# Patient Record
Sex: FEMALE | Race: BLACK OR AFRICAN AMERICAN | NOT HISPANIC OR LATINO | URBAN - METROPOLITAN AREA
[De-identification: names, ages, dates, MRNs, and addresses within clinical notes are randomized per-mention and may not be internally consistent; named-entity substitution may affect disease eponyms.]

---

## 2019-02-20 PROBLEM — Z00.00 ENCOUNTER FOR PREVENTIVE HEALTH EXAMINATION: Status: ACTIVE | Noted: 2019-02-20

## 2019-06-18 ENCOUNTER — INPATIENT (INPATIENT)
Facility: HOSPITAL | Age: 34
LOS: 2 days | Discharge: HOME | End: 2019-06-21
Attending: OBSTETRICS & GYNECOLOGY | Admitting: OBSTETRICS & GYNECOLOGY
Payer: COMMERCIAL

## 2019-06-18 ENCOUNTER — RESULT REVIEW (OUTPATIENT)
Age: 34
End: 2019-06-18

## 2019-06-18 VITALS — WEIGHT: 180.78 LBS | HEIGHT: 67 IN

## 2019-06-18 LAB
BLD GP AB SCN SERPL QL: NEGATIVE — SIGNIFICANT CHANGE UP
HCT VFR BLD CALC: 39.6 % — SIGNIFICANT CHANGE UP (ref 34.5–45)
HGB BLD-MCNC: 12.2 G/DL — SIGNIFICANT CHANGE UP (ref 11.5–15.5)
MCHC RBC-ENTMCNC: 23.9 PG — LOW (ref 27–34)
MCHC RBC-ENTMCNC: 30.8 GM/DL — LOW (ref 32–36)
MCV RBC AUTO: 77.6 FL — LOW (ref 80–100)
NRBC # BLD: 0 /100 WBCS — SIGNIFICANT CHANGE UP (ref 0–0)
PLATELET # BLD AUTO: 221 K/UL — SIGNIFICANT CHANGE UP (ref 150–400)
RBC # BLD: 5.1 M/UL — SIGNIFICANT CHANGE UP (ref 3.8–5.2)
RBC # FLD: 15.9 % — HIGH (ref 10.3–14.5)
RH IG SCN BLD-IMP: POSITIVE — SIGNIFICANT CHANGE UP
RH IG SCN BLD-IMP: POSITIVE — SIGNIFICANT CHANGE UP
WBC # BLD: 7.51 K/UL — SIGNIFICANT CHANGE UP (ref 3.8–10.5)
WBC # FLD AUTO: 7.51 K/UL — SIGNIFICANT CHANGE UP (ref 3.8–10.5)

## 2019-06-18 RX ORDER — OXYTOCIN 10 UNIT/ML
333.33 VIAL (ML) INJECTION
Qty: 20 | Refills: 0 | Status: DISCONTINUED | OUTPATIENT
Start: 2019-06-18 | End: 2019-06-19

## 2019-06-18 RX ORDER — FENTANYL/BUPIVACAINE/NS/PF 2MCG/ML-.1
250 PLASTIC BAG, INJECTION (ML) INJECTION
Refills: 0 | Status: DISCONTINUED | OUTPATIENT
Start: 2019-06-18 | End: 2019-06-19

## 2019-06-18 RX ORDER — SODIUM CHLORIDE 9 MG/ML
1000 INJECTION, SOLUTION INTRAVENOUS
Refills: 0 | Status: DISCONTINUED | OUTPATIENT
Start: 2019-06-18 | End: 2019-06-19

## 2019-06-18 RX ORDER — OXYTOCIN 10 UNIT/ML
1 VIAL (ML) INJECTION
Qty: 30 | Refills: 0 | Status: DISCONTINUED | OUTPATIENT
Start: 2019-06-18 | End: 2019-06-19

## 2019-06-18 RX ADMIN — SODIUM CHLORIDE 125 MILLILITER(S): 9 INJECTION, SOLUTION INTRAVENOUS at 12:28

## 2019-06-18 RX ADMIN — Medication 1 MILLIUNIT(S)/MIN: at 20:47

## 2019-06-18 NOTE — PATIENT PROFILE OB - MATERNAL MARITAL STATUS, OB PROFILE
TRANSFER - IN REPORT:    Verbal report received from Sandoval Joslyn  on [place]  being received from PACU for routine progression of care      Report consisted of patients Situation, Background, Assessment and   Recommendations(SBAR). Information from the following report(s) SBAR, Kardex, OR Summary, Procedure Summary, Intake/Output and MAR was reviewed with the receiving nurse. Screening Assessment for C Diff:     1. Three (3) or more diarrheal (liquid unformed) stools in less than 24 hours  no     2. If yes, has patient off laxatives for more than 24 hours? No     3. Was a stool specimen sent for C. Difficile toxin A and B? Not applicable     4. Was the patient placed on contact isolation? Not applicable    Opportunity for questions and clarification was provided. Assessment completed upon patients arrival to unit and care assumed. single

## 2019-06-19 LAB — T PALLIDUM AB TITR SER: NEGATIVE — SIGNIFICANT CHANGE UP

## 2019-06-19 RX ORDER — KETOROLAC TROMETHAMINE 30 MG/ML
30 SYRINGE (ML) INJECTION ONCE
Refills: 0 | Status: DISCONTINUED | OUTPATIENT
Start: 2019-06-19 | End: 2019-06-19

## 2019-06-19 RX ORDER — MAGNESIUM HYDROXIDE 400 MG/1
30 TABLET, CHEWABLE ORAL
Refills: 0 | Status: DISCONTINUED | OUTPATIENT
Start: 2019-06-19 | End: 2019-06-21

## 2019-06-19 RX ORDER — GENTAMICIN SULFATE 40 MG/ML
310 VIAL (ML) INJECTION ONCE
Refills: 0 | Status: COMPLETED | OUTPATIENT
Start: 2019-06-19 | End: 2019-06-19

## 2019-06-19 RX ORDER — TETANUS TOXOID, REDUCED DIPHTHERIA TOXOID AND ACELLULAR PERTUSSIS VACCINE, ADSORBED 5; 2.5; 8; 8; 2.5 [IU]/.5ML; [IU]/.5ML; UG/.5ML; UG/.5ML; UG/.5ML
0.5 SUSPENSION INTRAMUSCULAR ONCE
Refills: 0 | Status: DISCONTINUED | OUTPATIENT
Start: 2019-06-19 | End: 2019-06-21

## 2019-06-19 RX ORDER — DIBUCAINE 1 %
1 OINTMENT (GRAM) RECTAL EVERY 6 HOURS
Refills: 0 | Status: DISCONTINUED | OUTPATIENT
Start: 2019-06-19 | End: 2019-06-21

## 2019-06-19 RX ORDER — SIMETHICONE 80 MG/1
80 TABLET, CHEWABLE ORAL EVERY 4 HOURS
Refills: 0 | Status: DISCONTINUED | OUTPATIENT
Start: 2019-06-19 | End: 2019-06-21

## 2019-06-19 RX ORDER — IBUPROFEN 200 MG
600 TABLET ORAL EVERY 6 HOURS
Refills: 0 | Status: COMPLETED | OUTPATIENT
Start: 2019-06-19 | End: 2020-05-17

## 2019-06-19 RX ORDER — GLYCERIN ADULT
1 SUPPOSITORY, RECTAL RECTAL AT BEDTIME
Refills: 0 | Status: DISCONTINUED | OUTPATIENT
Start: 2019-06-19 | End: 2019-06-21

## 2019-06-19 RX ORDER — ACETAMINOPHEN 500 MG
975 TABLET ORAL
Refills: 0 | Status: DISCONTINUED | OUTPATIENT
Start: 2019-06-19 | End: 2019-06-21

## 2019-06-19 RX ORDER — OXYTOCIN 10 UNIT/ML
333.33 VIAL (ML) INJECTION
Qty: 20 | Refills: 0 | Status: DISCONTINUED | OUTPATIENT
Start: 2019-06-19 | End: 2019-06-21

## 2019-06-19 RX ORDER — LANOLIN
1 OINTMENT (GRAM) TOPICAL EVERY 6 HOURS
Refills: 0 | Status: DISCONTINUED | OUTPATIENT
Start: 2019-06-19 | End: 2019-06-21

## 2019-06-19 RX ORDER — DOCUSATE SODIUM 100 MG
100 CAPSULE ORAL
Refills: 0 | Status: DISCONTINUED | OUTPATIENT
Start: 2019-06-19 | End: 2019-06-21

## 2019-06-19 RX ORDER — OXYCODONE HYDROCHLORIDE 5 MG/1
5 TABLET ORAL ONCE
Refills: 0 | Status: DISCONTINUED | OUTPATIENT
Start: 2019-06-19 | End: 2019-06-21

## 2019-06-19 RX ORDER — ACETAMINOPHEN 500 MG
1000 TABLET ORAL ONCE
Refills: 0 | Status: COMPLETED | OUTPATIENT
Start: 2019-06-19 | End: 2019-06-19

## 2019-06-19 RX ORDER — BENZOCAINE 10 %
1 GEL (GRAM) MUCOUS MEMBRANE EVERY 6 HOURS
Refills: 0 | Status: DISCONTINUED | OUTPATIENT
Start: 2019-06-19 | End: 2019-06-21

## 2019-06-19 RX ORDER — IBUPROFEN 200 MG
600 TABLET ORAL EVERY 6 HOURS
Refills: 0 | Status: DISCONTINUED | OUTPATIENT
Start: 2019-06-19 | End: 2019-06-21

## 2019-06-19 RX ORDER — PRAMOXINE HYDROCHLORIDE 150 MG/15G
1 AEROSOL, FOAM RECTAL EVERY 4 HOURS
Refills: 0 | Status: DISCONTINUED | OUTPATIENT
Start: 2019-06-19 | End: 2019-06-21

## 2019-06-19 RX ORDER — DIPHENHYDRAMINE HCL 50 MG
25 CAPSULE ORAL EVERY 6 HOURS
Refills: 0 | Status: DISCONTINUED | OUTPATIENT
Start: 2019-06-19 | End: 2019-06-21

## 2019-06-19 RX ORDER — OXYCODONE HYDROCHLORIDE 5 MG/1
5 TABLET ORAL
Refills: 0 | Status: DISCONTINUED | OUTPATIENT
Start: 2019-06-19 | End: 2019-06-21

## 2019-06-19 RX ORDER — AMPICILLIN TRIHYDRATE 250 MG
2 CAPSULE ORAL EVERY 6 HOURS
Refills: 0 | Status: DISCONTINUED | OUTPATIENT
Start: 2019-06-19 | End: 2019-06-20

## 2019-06-19 RX ORDER — AER TRAVELER 0.5 G/1
1 SOLUTION RECTAL; TOPICAL EVERY 4 HOURS
Refills: 0 | Status: DISCONTINUED | OUTPATIENT
Start: 2019-06-19 | End: 2019-06-21

## 2019-06-19 RX ORDER — SODIUM CHLORIDE 9 MG/ML
3 INJECTION INTRAMUSCULAR; INTRAVENOUS; SUBCUTANEOUS EVERY 8 HOURS
Refills: 0 | Status: DISCONTINUED | OUTPATIENT
Start: 2019-06-19 | End: 2019-06-21

## 2019-06-19 RX ORDER — HYDROCORTISONE 1 %
1 OINTMENT (GRAM) TOPICAL EVERY 6 HOURS
Refills: 0 | Status: DISCONTINUED | OUTPATIENT
Start: 2019-06-19 | End: 2019-06-21

## 2019-06-19 RX ADMIN — Medication 1 SPRAY(S): at 17:00

## 2019-06-19 RX ADMIN — Medication 975 MILLIGRAM(S): at 22:49

## 2019-06-19 RX ADMIN — PRAMOXINE HYDROCHLORIDE 1 APPLICATION(S): 150 AEROSOL, FOAM RECTAL at 16:59

## 2019-06-19 RX ADMIN — Medication 216 GRAM(S): at 22:48

## 2019-06-19 RX ADMIN — Medication 200 MILLIGRAM(S): at 01:53

## 2019-06-19 RX ADMIN — Medication 30 MILLIGRAM(S): at 05:38

## 2019-06-19 RX ADMIN — Medication 1 APPLICATION(S): at 16:58

## 2019-06-19 RX ADMIN — Medication 975 MILLIGRAM(S): at 17:02

## 2019-06-19 RX ADMIN — Medication 100 MILLIGRAM(S): at 17:00

## 2019-06-19 RX ADMIN — Medication 1 TABLET(S): at 17:01

## 2019-06-19 RX ADMIN — Medication 216 GRAM(S): at 17:04

## 2019-06-19 RX ADMIN — Medication 975 MILLIGRAM(S): at 23:40

## 2019-06-19 RX ADMIN — Medication 1000 MILLIGRAM(S): at 01:51

## 2019-06-19 RX ADMIN — SODIUM CHLORIDE 3 MILLILITER(S): 9 INJECTION INTRAMUSCULAR; INTRAVENOUS; SUBCUTANEOUS at 07:24

## 2019-06-19 RX ADMIN — SODIUM CHLORIDE 3 MILLILITER(S): 9 INJECTION INTRAMUSCULAR; INTRAVENOUS; SUBCUTANEOUS at 21:22

## 2019-06-19 RX ADMIN — Medication 975 MILLIGRAM(S): at 17:45

## 2019-06-19 RX ADMIN — Medication 30 MILLIGRAM(S): at 02:32

## 2019-06-19 RX ADMIN — SODIUM CHLORIDE 3 MILLILITER(S): 9 INJECTION INTRAMUSCULAR; INTRAVENOUS; SUBCUTANEOUS at 14:28

## 2019-06-19 RX ADMIN — Medication 400 MILLIGRAM(S): at 01:00

## 2019-06-19 RX ADMIN — Medication 600 MILLIGRAM(S): at 14:49

## 2019-06-19 RX ADMIN — Medication 216 GRAM(S): at 01:10

## 2019-06-19 RX ADMIN — Medication 216 GRAM(S): at 07:24

## 2019-06-20 ENCOUNTER — TRANSCRIPTION ENCOUNTER (OUTPATIENT)
Age: 34
End: 2019-06-20

## 2019-06-20 PROCEDURE — 88307 TISSUE EXAM BY PATHOLOGIST: CPT

## 2019-06-20 PROCEDURE — 86850 RBC ANTIBODY SCREEN: CPT

## 2019-06-20 PROCEDURE — 86900 BLOOD TYPING SEROLOGIC ABO: CPT

## 2019-06-20 PROCEDURE — 36415 COLL VENOUS BLD VENIPUNCTURE: CPT

## 2019-06-20 PROCEDURE — 85027 COMPLETE CBC AUTOMATED: CPT

## 2019-06-20 PROCEDURE — 86901 BLOOD TYPING SEROLOGIC RH(D): CPT

## 2019-06-20 PROCEDURE — 86780 TREPONEMA PALLIDUM: CPT

## 2019-06-20 RX ADMIN — Medication 1 TABLET(S): at 18:31

## 2019-06-20 RX ADMIN — Medication 600 MILLIGRAM(S): at 23:07

## 2019-06-20 RX ADMIN — SODIUM CHLORIDE 3 MILLILITER(S): 9 INJECTION INTRAMUSCULAR; INTRAVENOUS; SUBCUTANEOUS at 05:47

## 2019-06-20 RX ADMIN — Medication 975 MILLIGRAM(S): at 09:27

## 2019-06-20 RX ADMIN — SODIUM CHLORIDE 3 MILLILITER(S): 9 INJECTION INTRAMUSCULAR; INTRAVENOUS; SUBCUTANEOUS at 17:22

## 2019-06-20 RX ADMIN — Medication 100 MILLIGRAM(S): at 23:07

## 2019-06-20 RX ADMIN — Medication 100 MILLIGRAM(S): at 09:27

## 2019-06-20 NOTE — DISCHARGE NOTE OB - ADMISSION DATE +STARTOFVISITDATE
Reason For Visit  NIKKI HENRIQUEZ is here today for a nurse visit for medication administration flulaval.      Current Meds   1. Cetirizine HCl - 10 MG Oral Tablet; TAKE 1 TABLET DAILY AS NEEDED - FOR ITCHING;   Therapy: [id number] to (Evaluate:07Oct2017)  Requested for: 08Aug2017; Last   Rx:[id number]; Status: ACTIVE - Transmit to Pharmacy - Awaiting Verification Ordered   2. Hydroxychloroquine Sulfate 200 MG Oral Tablet; TAKE ONE TABLET BY MOUTH TWICE   DAILY;   Therapy: [id number] to (Evaluate:73Drs9212)  Requested for: 08May2018; Last   Rx:[id number] Ordered   3. Triamcinolone Acetonide 0.5 % External Cream; APPLY4 TIMES DAILY IF NEEDED FOR   ITCHING;   Therapy: 04May2017 to (Evaluate:23Oct2017)  Requested for: 03Oct2017; Last   Rx:[id number]; Status: ACTIVE - Transmit to Pharmacy - Awaiting Verification Ordered   4. Vitamin D 2000 UNIT Oral Capsule; TAKE 1 CAPSULE EVERY DAY;   Therapy: 07May2018 to (Evaluate:03Nov2018)  Requested for: 07May2018; Last   Rx:[id number] Ordered    Allergies  Ibuprofen SUSP    Nurse Documentation    flu shot right deltoid NDC [id number], Patient was observed after administration and no side effects noted., Patient discharged to home., Flu Shot screening form completed., Vaccine authorization form completed.           Assessment   1. Encounter for preventive health examination (Z00.00)    Plan   1. Flulaval Quadrivalent 0.5 ML Intramuscular Suspension Prefilled Syringe    Signatures   Electronically signed by : Valerie Richardson CMA; Oct 26 2018 10:24AM CST    
Statement Selected

## 2019-06-20 NOTE — DISCHARGE NOTE OB - PATIENT PORTAL LINK FT
You can access the [facility] Patient Portal, offered by [facility], by registering with the following website: [email or web address]

## 2019-06-20 NOTE — DISCHARGE NOTE OB - CARE PLAN
Principal Discharge DX:	Postpartum state  Goal:	to feel well  Assessment and plan of treatment:	meeting all postpartum milestones. Safe for d/c, to f/u in office

## 2019-06-20 NOTE — PROGRESS NOTE ADULT - SUBJECTIVE AND OBJECTIVE BOX
Patient evaluated at bedside.   She reports pain is well controlled.    She has been ambulating without assistance, voiding spontaneously, and is breastfeeding.    She denies HA, dizziness, chest pain, palpitations, shortness of breathe, n/v, heavy vaginal bleeding or perineal discomfort.    Physical Exam:  Vital Signs Last 24 Hrs  T(C): 37 (20 Jun 2019 06:14), Max: 37 (20 Jun 2019 06:14)  T(F): 98.6 (20 Jun 2019 06:14), Max: 98.6 (20 Jun 2019 06:14)  HR: 80 (20 Jun 2019 06:14) (75 - 84)  BP: 96/64 (20 Jun 2019 06:14) (91/60 - 101/66)  BP(mean): --  RR: 17 (20 Jun 2019 06:14) (17 - 18)  SpO2: 99% (20 Jun 2019 06:14) (98% - 99%)    GA: NAD, A+0 x 3  Abd: + BS, soft, nontender, nondistended, no rebound or guarding, uterus firm at midline  : lochia WNL  Extremities: no swelling or calf tenderness                          12.2   7.51  )-----------( 221      ( 18 Jun 2019 12:10 )             39.6       MEDICATIONS  (STANDING):  acetaminophen   Tablet .. 975 milliGRAM(s) Oral <User Schedule>  diphtheria/tetanus/pertussis (acellular) Vaccine (ADAcel) 0.5 milliLiter(s) IntraMuscular once  ibuprofen  Tablet. 600 milliGRAM(s) Oral every 6 hours  oxytocin Infusion 333.333 milliUNIT(s)/Min (1000 mL/Hr) IV Continuous <Continuous>  prenatal multivitamin 1 Tablet(s) Oral daily  sodium chloride 0.9% lock flush 3 milliLiter(s) IV Push every 8 hours    MEDICATIONS  (PRN):  benzocaine 20%/menthol 0.5% Spray 1 Spray(s) Topical every 6 hours PRN for Perineal discomfort  dibucaine 1% Ointment 1 Application(s) Topical every 6 hours PRN Perineal discomfort  diphenhydrAMINE 25 milliGRAM(s) Oral every 6 hours PRN Pruritus  docusate sodium 100 milliGRAM(s) Oral two times a day PRN For stool softening  glycerin Suppository - Adult 1 Suppository(s) Rectal at bedtime PRN Constipation  hydrocortisone 1% Cream 1 Application(s) Topical every 6 hours PRN Moderate Pain (4-6)  lanolin Ointment 1 Application(s) Topical every 6 hours PRN nipple soreness  magnesium hydroxide Suspension 30 milliLiter(s) Oral two times a day PRN Constipation  oxyCODONE    IR 5 milliGRAM(s) Oral every 3 hours PRN Moderate to Severe Pain (4-10)  oxyCODONE    IR 5 milliGRAM(s) Oral once PRN Moderate to Severe Pain (4-10)  pramoxine 1%/zinc 5% Cream 1 Application(s) Topical every 4 hours PRN Moderate Pain (4-6)  simethicone 80 milliGRAM(s) Chew every 4 hours PRN Gas  witch hazel Pads 1 Application(s) Topical every 4 hours PRN Perineal discomfort

## 2019-06-20 NOTE — DISCHARGE NOTE OB - CARE PROVIDER_API CALL
Elvis Chowdary)  Gynecologic Oncology  [address] [place] [address]  Phone: [phone]  Fax: [phone]  Follow Up Time:

## 2019-06-21 VITALS
DIASTOLIC BLOOD PRESSURE: 72 MMHG | RESPIRATION RATE: 16 BRPM | SYSTOLIC BLOOD PRESSURE: 108 MMHG | HEART RATE: 66 BPM | TEMPERATURE: 98 F | OXYGEN SATURATION: 99 %

## 2019-06-21 LAB — SURGICAL PATHOLOGY STUDY: SIGNIFICANT CHANGE UP

## 2019-06-21 RX ADMIN — Medication 600 MILLIGRAM(S): at 00:19

## 2019-06-21 NOTE — PROGRESS NOTE ADULT - SUBJECTIVE AND OBJECTIVE BOX
Patient evaluated at bedside.   She reports pain is well controlled.    She has been ambulating without assistance, voiding spontaneously, and is breastfeeding.    She denies HA, dizziness, chest pain, palpitations, shortness of breathe, n/v, heavy vaginal bleeding or perineal discomfort.    Physical Exam:  Vital Signs Last 24 Hrs  T(C): 36.6 (20 Jun 2019 22:00), Max: 36.7 (20 Jun 2019 10:00)  T(F): 97.8 (20 Jun 2019 22:00), Max: 98 (20 Jun 2019 10:00)  HR: 85 (20 Jun 2019 22:00) (68 - 85)  BP: 107/71 (20 Jun 2019 22:00) (107/71 - 112/69)  BP(mean): --  RR: 17 (20 Jun 2019 22:00) (16 - 17)  SpO2: 98% (20 Jun 2019 22:00) (98% - 100%)    GA: NAD, A+0 x 3  Abd: + BS, soft, nontender, nondistended, no rebound or guarding, uterus firm at midline  : lochia WNL  Extremities: no swelling or calf tenderness        MEDICATIONS  (STANDING):  acetaminophen   Tablet .. 975 milliGRAM(s) Oral <User Schedule>  diphtheria/tetanus/pertussis (acellular) Vaccine (ADAcel) 0.5 milliLiter(s) IntraMuscular once  ibuprofen  Tablet. 600 milliGRAM(s) Oral every 6 hours  oxytocin Infusion 333.333 milliUNIT(s)/Min (1000 mL/Hr) IV Continuous <Continuous>  prenatal multivitamin 1 Tablet(s) Oral daily  sodium chloride 0.9% lock flush 3 milliLiter(s) IV Push every 8 hours    MEDICATIONS  (PRN):  benzocaine 20%/menthol 0.5% Spray 1 Spray(s) Topical every 6 hours PRN for Perineal discomfort  dibucaine 1% Ointment 1 Application(s) Topical every 6 hours PRN Perineal discomfort  diphenhydrAMINE 25 milliGRAM(s) Oral every 6 hours PRN Pruritus  docusate sodium 100 milliGRAM(s) Oral two times a day PRN For stool softening  glycerin Suppository - Adult 1 Suppository(s) Rectal at bedtime PRN Constipation  hydrocortisone 1% Cream 1 Application(s) Topical every 6 hours PRN Moderate Pain (4-6)  lanolin Ointment 1 Application(s) Topical every 6 hours PRN nipple soreness  magnesium hydroxide Suspension 30 milliLiter(s) Oral two times a day PRN Constipation  oxyCODONE    IR 5 milliGRAM(s) Oral every 3 hours PRN Moderate to Severe Pain (4-10)  oxyCODONE    IR 5 milliGRAM(s) Oral once PRN Moderate to Severe Pain (4-10)  pramoxine 1%/zinc 5% Cream 1 Application(s) Topical every 4 hours PRN Moderate Pain (4-6)  simethicone 80 milliGRAM(s) Chew every 4 hours PRN Gas  witch hazel Pads 1 Application(s) Topical every 4 hours PRN Perineal discomfort

## 2019-06-23 ENCOUNTER — EMERGENCY (EMERGENCY)
Facility: HOSPITAL | Age: 34
LOS: 1 days | Discharge: HOME | End: 2019-06-23
Attending: EMERGENCY MEDICINE | Admitting: EMERGENCY MEDICINE
Payer: COMMERCIAL

## 2019-06-23 VITALS
TEMPERATURE: 98 F | OXYGEN SATURATION: 99 % | WEIGHT: 169.98 LBS | DIASTOLIC BLOOD PRESSURE: 61 MMHG | SYSTOLIC BLOOD PRESSURE: 126 MMHG | RESPIRATION RATE: 16 BRPM | HEART RATE: 61 BPM

## 2019-06-23 VITALS
OXYGEN SATURATION: 98 % | RESPIRATION RATE: 18 BRPM | TEMPERATURE: 98 F | SYSTOLIC BLOOD PRESSURE: 154 MMHG | DIASTOLIC BLOOD PRESSURE: 91 MMHG | HEART RATE: 69 BPM

## 2019-06-23 DIAGNOSIS — R07.89 OTHER CHEST PAIN: ICD-10-CM

## 2019-06-23 DIAGNOSIS — R06.02 SHORTNESS OF BREATH: ICD-10-CM

## 2019-06-23 DIAGNOSIS — R03.0 ELEVATED BLOOD-PRESSURE READING, WITHOUT DIAGNOSIS OF HYPERTENSION: ICD-10-CM

## 2019-06-23 DIAGNOSIS — R10.13 EPIGASTRIC PAIN: ICD-10-CM

## 2019-06-23 LAB
ALBUMIN SERPL ELPH-MCNC: 3.2 G/DL — LOW (ref 3.3–5)
ALP SERPL-CCNC: 145 U/L — HIGH (ref 40–120)
ALT FLD-CCNC: 70 U/L — HIGH (ref 10–45)
ANION GAP SERPL CALC-SCNC: 9 MMOL/L — SIGNIFICANT CHANGE UP (ref 5–17)
APTT BLD: 28 SEC — SIGNIFICANT CHANGE UP (ref 27.5–36.3)
AST SERPL-CCNC: 48 U/L — HIGH (ref 10–40)
BASOPHILS # BLD AUTO: 0.03 K/UL — SIGNIFICANT CHANGE UP (ref 0–0.2)
BASOPHILS NFR BLD AUTO: 0.4 % — SIGNIFICANT CHANGE UP (ref 0–2)
BILIRUB SERPL-MCNC: 0.3 MG/DL — SIGNIFICANT CHANGE UP (ref 0.2–1.2)
BUN SERPL-MCNC: 9 MG/DL — SIGNIFICANT CHANGE UP (ref 7–23)
CALCIUM SERPL-MCNC: 8.9 MG/DL — SIGNIFICANT CHANGE UP (ref 8.4–10.5)
CHLORIDE SERPL-SCNC: 108 MMOL/L — SIGNIFICANT CHANGE UP (ref 96–108)
CK MB CFR SERPL CALC: 1.7 NG/ML — SIGNIFICANT CHANGE UP (ref 0–6.7)
CK SERPL-CCNC: 157 U/L — SIGNIFICANT CHANGE UP (ref 25–170)
CO2 SERPL-SCNC: 24 MMOL/L — SIGNIFICANT CHANGE UP (ref 22–31)
CREAT SERPL-MCNC: 1.06 MG/DL — SIGNIFICANT CHANGE UP (ref 0.5–1.3)
EOSINOPHIL # BLD AUTO: 0.11 K/UL — SIGNIFICANT CHANGE UP (ref 0–0.5)
EOSINOPHIL NFR BLD AUTO: 1.5 % — SIGNIFICANT CHANGE UP (ref 0–6)
GLUCOSE SERPL-MCNC: 78 MG/DL — SIGNIFICANT CHANGE UP (ref 70–99)
HCT VFR BLD CALC: 34.2 % — LOW (ref 34.5–45)
HGB BLD-MCNC: 10.6 G/DL — LOW (ref 11.5–15.5)
IMM GRANULOCYTES NFR BLD AUTO: 0.4 % — SIGNIFICANT CHANGE UP (ref 0–1.5)
INR BLD: 0.97 — SIGNIFICANT CHANGE UP (ref 0.88–1.16)
LYMPHOCYTES # BLD AUTO: 1.39 K/UL — SIGNIFICANT CHANGE UP (ref 1–3.3)
LYMPHOCYTES # BLD AUTO: 19.3 % — SIGNIFICANT CHANGE UP (ref 13–44)
MCHC RBC-ENTMCNC: 24.3 PG — LOW (ref 27–34)
MCHC RBC-ENTMCNC: 31 GM/DL — LOW (ref 32–36)
MCV RBC AUTO: 78.4 FL — LOW (ref 80–100)
MONOCYTES # BLD AUTO: 0.61 K/UL — SIGNIFICANT CHANGE UP (ref 0–0.9)
MONOCYTES NFR BLD AUTO: 8.5 % — SIGNIFICANT CHANGE UP (ref 2–14)
NEUTROPHILS # BLD AUTO: 5.03 K/UL — SIGNIFICANT CHANGE UP (ref 1.8–7.4)
NEUTROPHILS NFR BLD AUTO: 69.9 % — SIGNIFICANT CHANGE UP (ref 43–77)
NRBC # BLD: 0 /100 WBCS — SIGNIFICANT CHANGE UP (ref 0–0)
PLATELET # BLD AUTO: 230 K/UL — SIGNIFICANT CHANGE UP (ref 150–400)
POTASSIUM SERPL-MCNC: 3.9 MMOL/L — SIGNIFICANT CHANGE UP (ref 3.5–5.3)
POTASSIUM SERPL-SCNC: 3.9 MMOL/L — SIGNIFICANT CHANGE UP (ref 3.5–5.3)
PROT SERPL-MCNC: 6.2 G/DL — SIGNIFICANT CHANGE UP (ref 6–8.3)
PROTHROM AB SERPL-ACNC: 10.9 SEC — SIGNIFICANT CHANGE UP (ref 10–12.9)
RBC # BLD: 4.36 M/UL — SIGNIFICANT CHANGE UP (ref 3.8–5.2)
RBC # FLD: 15.3 % — HIGH (ref 10.3–14.5)
SODIUM SERPL-SCNC: 141 MMOL/L — SIGNIFICANT CHANGE UP (ref 135–145)
TROPONIN T SERPL-MCNC: <0.01 NG/ML — SIGNIFICANT CHANGE UP (ref 0–0.01)
WBC # BLD: 7.2 K/UL — SIGNIFICANT CHANGE UP (ref 3.8–10.5)
WBC # FLD AUTO: 7.2 K/UL — SIGNIFICANT CHANGE UP (ref 3.8–10.5)

## 2019-06-23 PROCEDURE — 99285 EMERGENCY DEPT VISIT HI MDM: CPT | Mod: 25

## 2019-06-23 PROCEDURE — 82553 CREATINE MB FRACTION: CPT

## 2019-06-23 PROCEDURE — 71046 X-RAY EXAM CHEST 2 VIEWS: CPT | Mod: 26

## 2019-06-23 PROCEDURE — 82550 ASSAY OF CK (CPK): CPT

## 2019-06-23 PROCEDURE — 84484 ASSAY OF TROPONIN QUANT: CPT

## 2019-06-23 PROCEDURE — 85730 THROMBOPLASTIN TIME PARTIAL: CPT

## 2019-06-23 PROCEDURE — 99284 EMERGENCY DEPT VISIT MOD MDM: CPT | Mod: 25

## 2019-06-23 PROCEDURE — 71275 CT ANGIOGRAPHY CHEST: CPT

## 2019-06-23 PROCEDURE — 71275 CT ANGIOGRAPHY CHEST: CPT | Mod: 26

## 2019-06-23 PROCEDURE — 85610 PROTHROMBIN TIME: CPT

## 2019-06-23 PROCEDURE — 85025 COMPLETE CBC W/AUTO DIFF WBC: CPT

## 2019-06-23 PROCEDURE — 80053 COMPREHEN METABOLIC PANEL: CPT

## 2019-06-23 PROCEDURE — 84550 ASSAY OF BLOOD/URIC ACID: CPT

## 2019-06-23 PROCEDURE — 83615 LACTATE (LD) (LDH) ENZYME: CPT

## 2019-06-23 PROCEDURE — 96360 HYDRATION IV INFUSION INIT: CPT | Mod: XU

## 2019-06-23 PROCEDURE — 71046 X-RAY EXAM CHEST 2 VIEWS: CPT

## 2019-06-23 PROCEDURE — 36415 COLL VENOUS BLD VENIPUNCTURE: CPT

## 2019-06-23 RX ORDER — SODIUM CHLORIDE 9 MG/ML
1000 INJECTION INTRAMUSCULAR; INTRAVENOUS; SUBCUTANEOUS ONCE
Refills: 0 | Status: COMPLETED | OUTPATIENT
Start: 2019-06-23 | End: 2019-06-23

## 2019-06-23 RX ORDER — FAMOTIDINE 10 MG/ML
20 INJECTION INTRAVENOUS ONCE
Refills: 0 | Status: COMPLETED | OUTPATIENT
Start: 2019-06-23 | End: 2019-06-23

## 2019-06-23 RX ADMIN — Medication 30 MILLILITER(S): at 05:20

## 2019-06-23 RX ADMIN — SODIUM CHLORIDE 1000 MILLILITER(S): 9 INJECTION INTRAMUSCULAR; INTRAVENOUS; SUBCUTANEOUS at 06:00

## 2019-06-23 RX ADMIN — SODIUM CHLORIDE 1000 MILLILITER(S): 9 INJECTION INTRAMUSCULAR; INTRAVENOUS; SUBCUTANEOUS at 07:19

## 2019-06-23 RX ADMIN — SODIUM CHLORIDE 2000 MILLILITER(S): 9 INJECTION INTRAMUSCULAR; INTRAVENOUS; SUBCUTANEOUS at 05:00

## 2019-06-23 RX ADMIN — FAMOTIDINE 20 MILLIGRAM(S): 10 INJECTION INTRAVENOUS at 05:20

## 2019-06-23 NOTE — CONSULT NOTE ADULT - ASSESSMENT
33y  postpartum day #4 from uncomplicated vaginal delivery on  presenting today with epigastric discomfort found to have mild range BP.   - BP range 126- 146/60-80  - CT angio performed to rule out PE, no PE and discomfort likely epigastric pain, recommend Tums and pepcid  - mild range BP, no severe range, no history of elevated BP, no headaches, spots in vision, or RUQ pain, noted to have elevated LFT of 48/70  no baseline LFTs, recommend that patient obtain BP cuff today, please give prescription, if any severe range /110 than must immediately return to hospital or if any symptoms of preeclampsia including headache, spots in vision, RUQ pain, SOB, CP, all explained to patient  - must call [facility] to schedule appointment for this week for BP check, if unable to schedule must walk into office to have BP checked, discussed with patient and all questions answered  - plan d/w Dr. Doe

## 2019-06-23 NOTE — ED PROVIDER NOTE - OBJECTIVE STATEMENT
32 yo  postpartum day #4 from uncomplicated vaginal delivery on  presenting today with epigastric discomfort found to have mild range BP. Patient reports Friday noted some upper abdomen discomfort aht improved after eating. Today discomfort was in upper abdomen that radiated to sternum region thus came for evaluation.   Denies fever, chills, chest pain, palpitations, SOB, n/v.  Reports mild discomfort in perineal region, otherwise no pain. Denies headache, spots in vision, RUQ pain, or leg pain  No history of elevated BP in pregnancy or in labor. 32 yo F  postpartum day #4 from uncomplicated vaginal delivery on  presenting today with epigastric/ chest discomfort on and off X 2 days. Pt states that she initially experienced some tightness in her chest/ epigastric abd area prior to being dcd from the hospital- thought she was hungry and then ate something and felt better. Similar sensations have returned over the past few days which are sometimes relieved by eating. Overnight pt with worsening sxs and thus presents to ED for further evaluation. Denies fever, chills, palpitations, SOB, N/V.  Reports mild discomfort in perineal region and lower abd area, otherwise no pain. Some mild vaginal bleeding which has decreased since giving birth. Denies headache, spots in vision, RUQ pain, or leg pain. No history of elevated BP in pregnancy or in labor.

## 2019-06-23 NOTE — ED ADULT NURSE REASSESSMENT NOTE - NS ED NURSE REASSESS COMMENT FT1
repeat bp remains elevated. additional labs drawn and sent as ordered. pending urine sample for testing. awaiting gyn c/s

## 2019-06-23 NOTE — ED PROVIDER NOTE - NSFOLLOWUPINSTRUCTIONS_ED_ALL_ED_FT
Follow up with your GYN tomorrow. Monitor your blood pressure at home. If it is equal to or greater than 160/110 or you develop ha, visual changes or any other concerning symptoms you need to return immediately.         Postpartum Hypertension  Postpartum hypertension is high blood pressure that remains higher than normal after childbirth. You may not realize that you have postpartum hypertension if your blood pressure is not being checked regularly. In most cases, postpartum hypertension will go away on its own, usually within a week of delivery. However, for some women, medical treatment is required to prevent serious complications, such as seizures or stroke.    What are the causes?  This condition may be caused by one or more of the following:  Hypertension that existed before pregnancy (chronic hypertension).  Hypertension that comes on as a result of pregnancy (gestational hypertension).  Hypertensive disorders during pregnancy (preeclampsia) or seizures in women who have high blood pressure during pregnancy (eclampsia).  A condition in which the liver, platelets, and red blood cells are damaged during pregnancy (HELLP syndrome).  A condition in which the thyroid produces too much hormones (hyperthyroidism).  Other rare problems of the nerves (neurological disorders) or blood disorders.  In some cases, the cause may not be known.    What increases the risk?  The following factors may make you more likely to develop this condition:  Chronic hypertension. In some cases, this may not have been diagnosed before pregnancy.  Obesity.  Type 2 diabetes.  Kidney disease.  History of preeclampsia or eclampsia.  Other medical conditions that change the level of hormones in the body (hormonal imbalance).  What are the signs or symptoms?  As with all types of hypertension, postpartum hypertension may not have any symptoms. Depending on how high your blood pressure is, you may experience:  Headaches. These may be mild, moderate, or severe. They may also be steady, constant, or sudden in onset (thunderclap headache).  Changes in your ability to see (visual changes).  Dizziness.  Shortness of breath.  Swelling of your hands, feet, lower legs, or face. In some cases, you may have swelling in more than one of these locations.  Heart palpitations or a racing heartbeat.  Difficulty breathing while lying down.  Decrease in the amount of urine that you pass.  Other rare signs and symptoms may include:  Sweating more than usual. This lasts longer than a few days after delivery.  Chest pain.  Sudden dizziness when you get up from sitting or lying down.  Seizures.  Nausea or vomiting.  Abdominal pain.  How is this diagnosed?  This condition may be diagnosed based on the results of a physical exam, blood pressure measurements, and blood and urine tests.    You may also have other tests, such as a CT scan or an MRI, to check for other problems of postpartum hypertension.    How is this treated?  ImageIf blood pressure is high enough to require treatment, your options may include:  Medicines to reduce blood pressure (antihypertensives). Tell your health care provider if you are breastfeeding or if you plan to breastfeed. There are many antihypertensive medicines that are safe to take while breastfeeding.  Stopping medicines that may be causing hypertension.  Treating medical conditions that are causing hypertension.  Treating the complications of hypertension, such as seizures, stroke, or kidney problems.  Your health care provider will also continue to monitor your blood pressure closely until it is within a safe range for you.    Follow these instructions at home:  Take over-the-counter and prescription medicines only as told by your health care provider.  Return to your normal activities as told by your health care provider. Ask your health care provider what activities are safe for you.  Do not use any products that contain nicotine or tobacco, such as cigarettes and e-cigarettes. If you need help quitting, ask your health care provider.  Keep all follow-up visits as told by your health care provider. This is important.  Contact a health care provider if:  Your symptoms get worse.  You have new symptoms, such as:  A headache that does not get better.  Dizziness.  Visual changes.  Get help right away if:  You suddenly develop swelling in your hands, ankles, or face.  You have sudden, rapid weight gain.  You develop difficulty breathing, chest pain, racing heartbeat, or heart palpitations.  You develop severe pain in your abdomen.  You have any symptoms of a stroke. "BE FAST" is an easy way to remember the main warning signs of a stroke:  B - Balance. Signs are dizziness, sudden trouble walking, or loss of balance.  E - Eyes. Signs are trouble seeing or a sudden change in vision.  F - Face. Signs are sudden weakness or numbness of the face, or the face or eyelid drooping on one side.  A - Arms. Signs are weakness or numbness in an arm. This happens suddenly and usually on one side of the body.  S - Speech. Signs are sudden trouble speaking, slurred speech, or trouble understanding what people say.  T - Time. Time to call emergency services. Write down what time symptoms started.  You have other signs of a stroke, such as:  A sudden, severe headache with no known cause.  Nausea or vomiting.  Seizure.  These symptoms may represent a serious problem that is an emergency. Do not wait to see if the symptoms will go away. Get medical help right away. Call your local emergency services (911 in the [place] Do not drive yourself to the hospital.     Summary  Postpartum hypertension is high blood pressure that remains higher than normal after childbirth.  In most cases, postpartum hypertension will go away on its own, usually within a week of delivery.  For some women, medical treatment is required to prevent serious complications, such as seizures or stroke.  This information is not intended to replace advice given to you by your health care provider. Make sure you discuss any questions you have with your health care provider.    Document Released: 08/21/2015 Document Revised: 10/08/2018 Document Reviewed: 10/08/2018  [employer] Interactive Patient Education © 2019 [employer]

## 2019-06-23 NOTE — ED PROVIDER NOTE - PROGRESS NOTE DETAILS
Pt evaluated in ED by GYN and recommend d/c home to f/u tomorrow with GYN. bp monitoring and return precautions d/w pt.

## 2019-06-23 NOTE — CONSULT NOTE ADULT - SUBJECTIVE AND OBJECTIVE BOX
33y  postpartum day #4 from uncomplicated vaginal delivery on  presenting today with epigastric discomfort found to have mild range BP. Patient reports Friday noted some upper abdomen discomfort aht improved after eating. Today discomfort was in upper abdomen that radiated to sternum region thus came for evaluation.   Denies fever, chills, chest pain, palpitations, SOB, n/v.  Reports mild discomfort in perineal region, otherwise no pain. Denies headache, spots in vision, RUQ pain, or leg pain  No history of elevated BP in pregnancy or in labor.     OB H/x:  G1- VTOP D&C   G2-  19 uncomplicated, no elevated BP    GYN H/x: denies  MED H/x: denies  SURG H/x: l/s appendectomy at 12yo   Medications: PNV  Allergies:  NKDA       Vital Signs Last 24 Hrs  T(C): 36.9 (2019 06:50), Max: 36.9 (2019 06:50)  T(F): 98.4 (2019 06:50), Max: 98.4 (2019 06:50)  HR: 64 (2019 06:50) (61 - 64)  BP: 146/84 (2019 06:50) (126/61 - 146/84)  BP(mean): --  RR: 18 (2019 06:50) (16 - 18)  SpO2: 100% (2019 06:50) (99% - 100%)    Physical Exam:  Gen: NAD, comfortable  CV: s1, s2, no m/r/g  Resp: normal breath sounds bilaterally , no wheezes/rhonchi/rales   GI: soft, nontender, nondistended + BS, no rebound no guarding  Ext: no edema, erythema, tenderness , reflexes 1+ bilaterally     LABS:                        10.6   7.20  )-----------( 230      ( 2019 05:06 )             34.2     -    141  |  108  |  9   ----------------------------<  78  3.9   |  24  |  1.06    Ca    8.9      2019 05:06    TPro  6.2  /  Alb  3.2<L>  /  TBili  0.3  /  DBili  x   /  AST  48<H>  /  ALT  70<H>  /  AlkPhos  145<H>  06-23    PT/INR - ( 2019 05:06 )   PT: 10.9 sec;   INR: 0.97          PTT - ( 2019 05:06 )  PTT:28.0 sec      RADIOLOGY & ADDITIONAL STUDIES:

## 2019-06-23 NOTE — ED ADULT TRIAGE NOTE - CHIEF COMPLAINT QUOTE
pt complaining of chest tightness SOB and intermittent abd discomfort since Friday keeping her out of her sleep tonight pt is 4 days post partum normal vaginal delivery reports no complications still has some vaginal bleeding

## 2019-06-23 NOTE — ED PROVIDER NOTE - CARE PLAN
Principal Discharge DX:	SOB (shortness of breath)  Secondary Diagnosis:	Elevated BP without diagnosis of hypertension Principal Discharge DX:	Chest pain  Secondary Diagnosis:	Elevated BP without diagnosis of hypertension

## 2019-06-23 NOTE — ED ADULT NURSE REASSESSMENT NOTE - NS ED NURSE REASSESS COMMENT FT1
bedside istat done as per request of dr carrasco - creatinine 0.9 awaiting cmp read - bedside istat done as per request of dr carrasco - creatinine 0.9

## 2019-06-23 NOTE — ED PROVIDER NOTE - CLINICAL SUMMARY MEDICAL DECISION MAKING FREE TEXT BOX
34 yo F  postpartum day #4 from uncomplicated vaginal delivery on  presenting today with epigastric/ chest discomfort on and off X 2 days. Pt states that she initially experienced some tightness in her chest/ epigastric abd area prior to being dcd from the hospital- thought she was hungry and then ate something and felt better. Similar sensations have returned over the past few days which are sometimes relieved by eating. Overnight pt with worsening sxs and thus presents to ED for further evaluation. Denies fever, chills, palpitations, SOB, N/V. Labs/ studies noted. LFTs mildly elevated. EKG and CXR WNL. Trop negative. CTA negative for PE. On discharge pt with increased BPs- 150s/80s. Denies headache, spots in vision, RUQ pain, or leg pain. No history of elevated BP in pregnancy or in labor. UA/ LDH/ Uric acid sent and Gyn consulted to evaluate for preeclampsia. Pt stable in ED. Case endorsed to day team pending labs/ UA and gyn evaluation.

## 2019-06-23 NOTE — ED ADULT NURSE NOTE - OBJECTIVE STATEMENT
RN note: aaox4 ambulatory female from home, presents to the ED with two days of chest tightness. pt describes "it starts as a pain in my abdomen and then it comes up". reports feeling "hungry" and then experiences chest tightness. denies sob. denies leg swelling, redness or warmth. RN note: aaox4 ambulatory female from home, presents to the ED with two days of chest tightness. pt describes "it starts as a pain in my abdomen and then it comes up". reports feeling "hungry" and then experiences chest tightness. denies sob. denies leg swelling, redness or warmth. reports still experiencing vaginal bleeding - as expects. denies worsening bleeding. denies nausea, vomiting, fevers, chills, diarrhea. nsr on cardiac monitor. respirations are symmetrical and unlabored on room air. -lanette ramirez rn

## 2019-06-23 NOTE — ED ADULT NURSE NOTE - NSIMPLEMENTINTERV_GEN_ALL_ED
Implemented All Universal Safety Interventions:  [place] to call system. Call bell, personal items and telephone within reach. Instruct patient to call for assistance. Room bathroom lighting operational. Non-slip footwear when patient is off stretcher. Physically safe environment: no spills, clutter or unnecessary equipment. Stretcher in lowest position, wheels locked, appropriate side rails in place.

## 2025-04-07 NOTE — CONSULT NOTE ADULT - PROVIDER SPECIALTY LIST ADULT
GYN [Fever] : no fever [Chills] : no chills [Night Sweats] : no night sweats [Recent Change In Weight] : ~T no recent weight change [Chest Pain] : no chest pain [Abdominal Pain] : no abdominal pain [Nausea] : no nausea [Constipation] : no constipation [Diarrhea] : diarrhea [Vomiting] : no vomiting [Heartburn] : no heartburn [Dysuria] : no dysuria [Muscle Pain] : no muscle pain [FreeTextEntry5] : palpitations with toprol that resolved after stopping metoprolol and switching back to coreg  [FreeTextEntry7] : has normal bowel movements every other day [FreeTextEntry9] : improvement in myalgia after increase in synthroid dose